# Patient Record
Sex: MALE | Race: WHITE | NOT HISPANIC OR LATINO | Employment: FULL TIME | URBAN - METROPOLITAN AREA
[De-identification: names, ages, dates, MRNs, and addresses within clinical notes are randomized per-mention and may not be internally consistent; named-entity substitution may affect disease eponyms.]

---

## 2018-11-20 ENCOUNTER — TELEPHONE (OUTPATIENT)
Dept: UROLOGY | Facility: AMBULATORY SURGERY CENTER | Age: 43
End: 2018-11-20

## 2018-11-20 NOTE — TELEPHONE ENCOUNTER
Reason for appointment/Complaint/Diagnosis : vas consult    Insurance: [employer]    History of Cancer? no                       If yes, what kind? Previous urologist?     None                  Records requested/where? No    Outside testing/where? No    Location Preference for office visit?  [facility]

## 2019-01-25 ENCOUNTER — OFFICE VISIT (OUTPATIENT)
Dept: UROLOGY | Facility: AMBULATORY SURGERY CENTER | Age: 44
End: 2019-01-25
Payer: COMMERCIAL

## 2019-01-25 VITALS
BODY MASS INDEX: 26.42 KG/M2 | SYSTOLIC BLOOD PRESSURE: 130 MMHG | HEIGHT: 76 IN | WEIGHT: 217 LBS | HEART RATE: 82 BPM | DIASTOLIC BLOOD PRESSURE: 80 MMHG

## 2019-01-25 DIAGNOSIS — Z30.2 ENCOUNTER FOR STERILIZATION: Primary | ICD-10-CM

## 2019-01-25 PROCEDURE — 99244 OFF/OP CNSLTJ NEW/EST MOD 40: CPT | Performed by: UROLOGY

## 2019-01-25 RX ORDER — LORAZEPAM 2 MG/1
2 TABLET ORAL
Qty: 1 TABLET | Refills: 0 | Status: SHIPPED | OUTPATIENT
Start: 2019-01-25 | End: 2019-04-12 | Stop reason: ALTCHOICE

## 2019-01-25 RX ORDER — VENLAFAXINE HYDROCHLORIDE 75 MG/1
75 CAPSULE, EXTENDED RELEASE ORAL DAILY
Refills: 1 | COMMUNITY
Start: 2018-12-05

## 2019-01-25 NOTE — LETTER
January 25, 2019     [place] [id number]    Patient: Katarina Dior   YOB: 1975   Date of Visit: 1/25/2019       Dear Dr Gabby Laughlin:    Thank you for referring Katarina Dior to me for evaluation  Below are my notes for this consultation  If you have questions, please do not hesitate to call me  I look forward to following your patient along with you  Sincerely,        Victoria Nolasco MD        CC: No Recipients  Victoria Nolasco MD  1/25/2019  2:45 PM  Sign at close encounter  1/25/2019    Katarina Dior  1975  [id number]        Assessment  Elective sterilization    Plan  We discussed our vasectomy packet in detail  He understands the indications, risks, and benefits of the procedure  He understands that this is generally considered a permanent procedure although there is a method for reversal, it is not guaranteed to work and would not be covered by insurance  He understands that he would not be considered sterile until negative semen analysis is obtained post procedure  He further understands that he must rest, ice, and elevate the scrotum for at least 48 hours to avoid complications such as hematoma  Consent was obtained in the office today  All of his questions were answered  He was given a prescription for Ativan to be taken prior to the visit  History of Present Illness  Navin Barbosa is a 41 y o  male requesting elective sterilization  He has 3 boys  He and his wife are in agreement  He denies any prior urologic history  He works for [employer] and does do lifting at work  Review of Systems  Review of Systems   Constitutional: Negative  HENT: Negative  Respiratory: Negative  Cardiovascular: Negative  Gastrointestinal: Negative  Genitourinary:        As per HPI   Musculoskeletal: Negative  Skin: Negative  Neurological: Negative  Hematological: Negative  Past Medical History  History reviewed   No pertinent past medical history  Past Social History  Past Surgical History:   Procedure Laterality Date    WISDOM TOOTH EXTRACTION         Past Family History  Family History   Problem Relation Age of Onset    Prostate cancer Father        Past Social history  Social History     Social History    Marital status: /Civil Union     Spouse name: N/A    Number of children: N/A    Years of education: N/A     Occupational History    Not on file  Social History Main Topics    Smoking status: Current Some Day Smoker    Smokeless tobacco: Never Used      Comment: cigars socially    Alcohol use Yes      Comment: social    Drug use: No    Sexual activity: Not on file     Other Topics Concern    Not on file     Social History Narrative    No narrative on file     History   Smoking Status    Current Some Day Smoker   Smokeless Tobacco    Never Used     Comment: cigars socially       Current Medications  Current Outpatient Prescriptions   Medication Sig Dispense Refill    venlafaxine (EFFEXOR-XR) 75 mg 24 hr capsule Take 75 mg by mouth daily  1    LORazepam (ATIVAN) 2 mg tablet Take 1 tablet (2 mg total) by mouth 60 minutes pre-procedure 1 tablet 0     No current facility-administered medications for this visit  Allergies  No Known Allergies    Past Medical History, Social History, Family History, medications and allergies were reviewed  Vitals  Vitals:    01/25/19 1418   BP: 130/80   Pulse: 82   Weight: 98 4 kg (217 lb)   Height: 6' 4" (1 93 m)       Physical Exam  Physical Exam   Constitutional: He is oriented to person, place, and time  He appears well-developed and well-nourished  Cardiovascular: Normal rate  Pulmonary/Chest: Effort normal    Abdominal: Soft  Genitourinary:   Genitourinary Comments: Normal Vasa and testes   Musculoskeletal: Normal range of motion  Neurological: He is alert and oriented to person, place, and time  Skin: Skin is warm, dry and intact  Psychiatric: He has a normal mood and affect  Vitals reviewed          Results  No results found for: PSA  No results found for: GLUCOSE, CALCIUM, NA, K, CO2, CL, BUN, CREATININE  No results found for: WBC, HGB, HCT, MCV, PLT

## 2019-01-25 NOTE — PROGRESS NOTES
1/25/2019    Shabana Odor  1975  [id number]        Assessment  Elective sterilization    Plan  We discussed our vasectomy packet in detail  He understands the indications, risks, and benefits of the procedure  He understands that this is generally considered a permanent procedure although there is a method for reversal, it is not guaranteed to work and would not be covered by insurance  He understands that he would not be considered sterile until negative semen analysis is obtained post procedure  He further understands that he must rest, ice, and elevate the scrotum for at least 48 hours to avoid complications such as hematoma  Consent was obtained in the office today  All of his questions were answered  He was given a prescription for Ativan to be taken prior to the visit  History of Present Illness  Jazmin Joe is a 41 y o  male requesting elective sterilization  He has 3 boys  He and his wife are in agreement  He denies any prior urologic history  He works for [employer] and does do lifting at work  Review of Systems  Review of Systems   Constitutional: Negative  HENT: Negative  Respiratory: Negative  Cardiovascular: Negative  Gastrointestinal: Negative  Genitourinary:        As per HPI   Musculoskeletal: Negative  Skin: Negative  Neurological: Negative  Hematological: Negative  Past Medical History  History reviewed  No pertinent past medical history  Past Social History  Past Surgical History:   Procedure Laterality Date    WISDOM TOOTH EXTRACTION         Past Family History  Family History   Problem Relation Age of Onset    Prostate cancer Father        Past Social history  Social History     Social History    Marital status: /Civil Union     Spouse name: N/A    Number of children: N/A    Years of education: N/A     Occupational History    Not on file       Social History Main Topics    Smoking status: Current Some Day Smoker    Smokeless tobacco: Never Used      Comment: cigars socially    Alcohol use Yes      Comment: social    Drug use: No    Sexual activity: Not on file     Other Topics Concern    Not on file     Social History Narrative    No narrative on file     History   Smoking Status    Current Some Day Smoker   Smokeless Tobacco    Never Used     Comment: cigars socially       Current Medications  Current Outpatient Prescriptions   Medication Sig Dispense Refill    venlafaxine (EFFEXOR-XR) 75 mg 24 hr capsule Take 75 mg by mouth daily  1    LORazepam (ATIVAN) 2 mg tablet Take 1 tablet (2 mg total) by mouth 60 minutes pre-procedure 1 tablet 0     No current facility-administered medications for this visit  Allergies  No Known Allergies    Past Medical History, Social History, Family History, medications and allergies were reviewed  Vitals  Vitals:    01/25/19 1418   BP: 130/80   Pulse: 82   Weight: 98 4 kg (217 lb)   Height: 6' 4" (1 93 m)       Physical Exam  Physical Exam   Constitutional: He is oriented to person, place, and time  He appears well-developed and well-nourished  Cardiovascular: Normal rate  Pulmonary/Chest: Effort normal    Abdominal: Soft  Genitourinary:   Genitourinary Comments: Normal Vasa and testes   Musculoskeletal: Normal range of motion  Neurological: He is alert and oriented to person, place, and time  Skin: Skin is warm, dry and intact  Psychiatric: He has a normal mood and affect  Vitals reviewed          Results  No results found for: PSA  No results found for: GLUCOSE, CALCIUM, NA, K, CO2, CL, BUN, CREATININE  No results found for: WBC, HGB, HCT, MCV, PLT

## 2019-03-22 ENCOUNTER — TELEPHONE (OUTPATIENT)
Dept: UROLOGY | Facility: MEDICAL CENTER | Age: 44
End: 2019-03-22

## 2019-03-22 NOTE — TELEPHONE ENCOUNTER
Patient returning called back    Patient has  Juan Miguel Goodman  PPO Member ID [id number] provider [phone]

## 2019-03-28 ENCOUNTER — PROCEDURE VISIT (OUTPATIENT)
Dept: UROLOGY | Facility: CLINIC | Age: 44
End: 2019-03-28
Payer: COMMERCIAL

## 2019-03-28 VITALS
DIASTOLIC BLOOD PRESSURE: 82 MMHG | WEIGHT: 209 LBS | SYSTOLIC BLOOD PRESSURE: 120 MMHG | HEART RATE: 68 BPM | BODY MASS INDEX: 25.44 KG/M2

## 2019-03-28 DIAGNOSIS — Z30.2 ENCOUNTER FOR STERILIZATION: Primary | ICD-10-CM

## 2019-03-28 PROCEDURE — 88302 TISSUE EXAM BY PATHOLOGIST: CPT | Performed by: PATHOLOGY

## 2019-03-28 PROCEDURE — 55250 REMOVAL OF SPERM DUCT(S): CPT | Performed by: UROLOGY

## 2019-03-28 RX ORDER — HYDROCODONE BITARTRATE AND ACETAMINOPHEN 5; 325 MG/1; MG/1
1 TABLET ORAL EVERY 6 HOURS PRN
Qty: 5 TABLET | Refills: 0 | Status: SHIPPED | OUTPATIENT
Start: 2019-03-28 | End: 2019-04-12 | Stop reason: ALTCHOICE

## 2019-03-28 NOTE — PROGRESS NOTES
Procedure: Vasectomy   Risks, benefits and alternatives were discussed with the patient  We discussed possible complications, including infection and bleeding  The patient was premedicated with lorazepam    The scrotum was anesthetized with of lidocaine 2%  The skin was opened with the sharp clamp and the right vas was grasped with the ring clamp  The perivasal sheath and vessels were then dissected off bluntly and the vas was doubly clamped  A portion was excised and both ends were then fulgurated and tied with 0 silk  Seeing good hemostasis, they were returned to the scrotum and the skin was closed with a chromic stich     The skin was opened with the sharp clamp and the left vas was grasped with the ring clamp  The perivasal sheath and vessels were then dissected off bluntly and the vas was doubly clamped  A portion was excised and both ends were then fulgurated and tied with 0 silk  Seeing good hemostasis, they were returned to the scrotum and the skin was closed with a chromic stich  Antibiotic ointment was applied  The patient tolerated the procedure well  there were no complications  He was given a prescription for Norco postprocedure  He understands he is not to be considered sterile until 2 consecutive negative semen analyses are obtained postprocedure   He also understands he should rest, ice and elevate the scrotum for atleast 48 hours to avoid complication such as hematoma

## 2019-04-11 PROBLEM — Z98.52 STATUS POST VASECTOMY: Status: ACTIVE | Noted: 2019-04-11

## 2019-04-12 ENCOUNTER — OFFICE VISIT (OUTPATIENT)
Dept: UROLOGY | Facility: CLINIC | Age: 44
End: 2019-04-12

## 2019-04-12 VITALS
BODY MASS INDEX: 25.08 KG/M2 | WEIGHT: 206 LBS | DIASTOLIC BLOOD PRESSURE: 80 MMHG | HEART RATE: 60 BPM | SYSTOLIC BLOOD PRESSURE: 120 MMHG

## 2019-04-12 DIAGNOSIS — Z98.52 STATUS POST VASECTOMY: Primary | ICD-10-CM

## 2019-04-12 PROCEDURE — 99024 POSTOP FOLLOW-UP VISIT: CPT | Performed by: PHYSICIAN ASSISTANT
